# Patient Record
Sex: FEMALE | Race: ASIAN | NOT HISPANIC OR LATINO | URBAN - METROPOLITAN AREA
[De-identification: names, ages, dates, MRNs, and addresses within clinical notes are randomized per-mention and may not be internally consistent; named-entity substitution may affect disease eponyms.]

---

## 2024-08-12 ENCOUNTER — EMERGENCY (EMERGENCY)
Facility: HOSPITAL | Age: 54
LOS: 1 days | Discharge: HOME | End: 2024-08-12
Attending: EMERGENCY MEDICINE | Admitting: EMERGENCY MEDICINE
Payer: MEDICAID

## 2024-08-12 VITALS
TEMPERATURE: 98 F | DIASTOLIC BLOOD PRESSURE: 99 MMHG | OXYGEN SATURATION: 99 % | SYSTOLIC BLOOD PRESSURE: 152 MMHG | HEART RATE: 82 BPM | RESPIRATION RATE: 16 BRPM | WEIGHT: 147.93 LBS | HEIGHT: 61 IN

## 2024-08-12 LAB
APPEARANCE UR: CLEAR — SIGNIFICANT CHANGE UP
BILIRUB UR-MCNC: NEGATIVE — SIGNIFICANT CHANGE UP
COLOR SPEC: ABNORMAL
DIFF PNL FLD: ABNORMAL
GLUCOSE UR QL: NEGATIVE MG/DL — SIGNIFICANT CHANGE UP
KETONES UR-MCNC: NEGATIVE MG/DL — SIGNIFICANT CHANGE UP
LEUKOCYTE ESTERASE UR-ACNC: ABNORMAL
NITRITE UR-MCNC: NEGATIVE — SIGNIFICANT CHANGE UP
PH UR: 6.5 — SIGNIFICANT CHANGE UP (ref 5–8)
PROT UR-MCNC: 100 MG/DL
SP GR SPEC: 1.01 — SIGNIFICANT CHANGE UP (ref 1–1.03)
UROBILINOGEN FLD QL: 0.2 MG/DL — SIGNIFICANT CHANGE UP (ref 0.2–1)

## 2024-08-12 PROCEDURE — 99284 EMERGENCY DEPT VISIT MOD MDM: CPT

## 2024-08-12 RX ORDER — ACETAMINOPHEN 500 MG
650 TABLET ORAL ONCE
Refills: 0 | Status: COMPLETED | OUTPATIENT
Start: 2024-08-12 | End: 2024-08-12

## 2024-08-12 RX ORDER — CEFPODOXIME PROXETIL 100 MG
200 TABLET ORAL ONCE
Refills: 0 | Status: COMPLETED | OUTPATIENT
Start: 2024-08-12 | End: 2024-08-12

## 2024-08-12 RX ORDER — IBUPROFEN 200 MG
400 TABLET ORAL ONCE
Refills: 0 | Status: COMPLETED | OUTPATIENT
Start: 2024-08-12 | End: 2024-08-12

## 2024-08-12 RX ORDER — CEFDINIR 300 MG/1
1 CAPSULE ORAL
Qty: 14 | Refills: 0
Start: 2024-08-12 | End: 2024-08-18

## 2024-08-12 RX ADMIN — Medication 400 MILLIGRAM(S): at 11:28

## 2024-08-12 RX ADMIN — Medication 200 MILLIGRAM(S): at 13:50

## 2024-08-12 RX ADMIN — Medication 650 MILLIGRAM(S): at 11:21

## 2024-08-12 NOTE — ED ADULT NURSE NOTE - OBJECTIVE STATEMENT
patient  Alert & ox4. c/o hematuria, burning upon urination and pelvic pain since this morning. pt . evaluated by MD.Due meds given as per order and urine send the lab.patient will be waiting for results, further evaluation and disposition.  made comfortable.will continue to monitor.

## 2024-08-12 NOTE — ED PROVIDER NOTE - OBJECTIVE STATEMENT
53F p/w dysuria, urinary frequency, and hematuria x 1 day, started about 3pm yesterday.  No vomiting or fever.  No flank pain.  Pt also reports midline suprapubic pain as well.  No vaginal bleeding.  Pt did travel to [facility] 2 weeks ago but did not get sick.  No rash.  Never happened before.  PMHX HTN HLD meds lisinopril, atorvastatin.  PSHX atorvastatin.  NKDA.  No T.  VS mild HTN.  On exam anxious but not in distress.  Mild SP ttp no RLQ/LLQ ttp.  Impression UTI.  Rx tylenol, check UA.  Likely d/c home on PO abx, will rx cefdinir x 7 days.  Pharmacy "[facility] pharmacy [id number]  VS:  unremarkable except HTN    GEN - NAD;   anxious;   A+O x3   HEAD - NC/AT     ENT - PEERL, EOMI, mucous membranes    moist , no discharge      NECK: Neck supple, non-tender without lymphadenopathy, no masses, no JVD  PULM - CTA b/l,  symmetric breath sounds  COR -  normal heart sounds    ABD - , ND, SP ttp, soft,  BACK - no CVA tenderness, nontender spine     EXTREMS - no edema, no deformity, warm and well perfused    SKIN - no rash    or bruising      NEUROLOGIC - alert, face symmetric, speech fluent, sensation nl, motor no focal deficit. 53F p/w dysuria, urinary frequency, and hematuria x 1 day, started about 3pm yesterday.  No vomiting or fever.  No flank pain.  Pt also reports midline suprapubic pain as well.  No vaginal bleeding.  Pt did travel to [facility] 2 weeks ago but did not get sick.  No rash.  Never happened before.  PMHX HTN HLD meds lisinopril, atorvastatin.  PSHX atorvastatin.  NKDA.  No T.  VS mild HTN.  On exam anxious but not in distress.  Mild SP ttp no RLQ/LLQ ttp.  Impression UTI.  Rx tylenol, check UA.  Likely d/c home on PO abx, will rx cefdinir x 7 days.  If UA not c/w infection will consider imaging for e.g. kidney stone, mass etc - UA confirms dx of UTI.  Pharmacy [facility] [id number]  VS:  unremarkable except HTN    GEN - NAD;   anxious;   A+O x3   HEAD - NC/AT     ENT - PEERL, EOMI, mucous membranes    moist , no discharge      NECK: Neck supple, non-tender without lymphadenopathy, no masses, no JVD  PULM - CTA b/l,  symmetric breath sounds  COR -  normal heart sounds    ABD - , ND, SP ttp, soft,  BACK - no CVA tenderness, nontender spine     EXTREMS - no edema, no deformity, warm and well perfused    SKIN - no rash    or bruising      NEUROLOGIC - alert, face symmetric, speech fluent, sensation nl, motor no focal deficit.

## 2024-08-12 NOTE — ED PROVIDER NOTE - CLINICAL SUMMARY MEDICAL DECISION MAKING FREE TEXT BOX
53F p/w dysuria, urinary frequency, and hematuria x 1 day, started about 3pm yesterday.  No vomiting or fever.  No flank pain.  Pt also reports midline suprapubic pain as well.  No vaginal bleeding.  Pt did travel to [facility] 2 weeks ago but did not get sick.  No rash.  Never happened before.  PMHX HTN HLD meds lisinopril, atorvastatin.  PSHX atorvastatin.  NKDA.  No T.  VS mild HTN.  On exam anxious but not in distress.  Mild SP ttp no RLQ/LLQ ttp.  Impression UTI.  Rx tylenol, check UA.  Likely d/c home on PO abx, will rx cefdinir x 7 days.  If UA not c/w infection will consider imaging for e.g. kidney stone, mass etc - UA confirms dx of UTI.  Pharmacy [place] pharmacy [id number]

## 2024-08-12 NOTE — ED PROVIDER NOTE - PATIENT PORTAL LINK FT
You can access the FollowMyHealth Patient Portal offered by [facility] by registering at the following website: [email or web address] By joining [facility]’s FollowMyHealth portal, you will also be able to view your health information using other applications (apps) compatible with our system.

## 2024-08-12 NOTE — ED ADULT TRIAGE NOTE - GLASGOW COMA SCALE: BEST MOTOR RESPONSE, MLM
Addended by: BELEM KARIMI on: 4/19/2021 10:39 AM     Modules accepted: Orders    
(M6) obeys commands

## 2024-08-12 NOTE — ED ADULT NURSE NOTE - NSFALLUNIVINTERV_ED_ALL_ED
Bed/Stretcher in lowest position, wheels locked, appropriate side rails in place/Call bell, personal items and telephone in reach/Instruct patient to call for assistance before getting out of bed/chair/stretcher/Non-slip footwear applied when patient is off stretcher/[place] to call system/Physically safe environment - no spills, clutter or unnecessary equipment/Purposeful proactive rounding/Room/bathroom lighting operational, light cord in reach

## 2024-08-12 NOTE — ED PROVIDER NOTE - PHYSICAL EXAMINATION
VS:  unremarkable except HTN    GEN - NAD;   anxious;   A+O x3   HEAD - NC/AT     ENT - PEERL, EOMI, mucous membranes    moist , no discharge      NECK: Neck supple, non-tender without lymphadenopathy, no masses, no JVD  PULM - CTA b/l,  symmetric breath sounds  COR -  normal heart sounds    ABD - , ND, SP ttp, soft,  BACK - no CVA tenderness, nontender spine     EXTREMS - no edema, no deformity, warm and well perfused    SKIN - no rash    or bruising      NEUROLOGIC - alert, face symmetric, speech fluent, sensation nl, motor no focal deficit.

## 2024-08-12 NOTE — ED PROVIDER NOTE - NSFOLLOWUPINSTRUCTIONS_ED_ALL_ED_FT
FOLLOW UP WITH YOUR  DOCTOR WITHIN 1 WEEK  BRING THE COPIES OF YOUR RESULTS WITH YOU (PROVIDED)  CAN TAKE TYLENOL 650MG ORALLY EVERY 6 HOURS FOR PAIN OR FEVER.  IBUPROFEN 400MG ORALLY EVERY 6 HOURS FOR PAIN OR FEVER.    CAN TAKE TYLENOL AND IBUPROFEN AT THE SAME TIME  RETURN TO ED FOR NEW OR WORSENING SYMPTOMS.    CEFDINIR 300MG ORALLY TWICE DAILY FOR 7 DAYS

## 2024-08-12 NOTE — ED ADULT TRIAGE NOTE - CHIEF COMPLAINT QUOTE
Pt c/o hematuria, burning upon urination and pelvic pain since this morning. Denies any vomiting, fever/chills, chest pain or sob. PMH HTN.